# Patient Record
Sex: FEMALE | Race: WHITE | Employment: FULL TIME | URBAN - METROPOLITAN AREA
[De-identification: names, ages, dates, MRNs, and addresses within clinical notes are randomized per-mention and may not be internally consistent; named-entity substitution may affect disease eponyms.]

---

## 2019-07-11 ENCOUNTER — TRANSFERRED RECORDS (OUTPATIENT)
Dept: HEALTH INFORMATION MANAGEMENT | Facility: CLINIC | Age: 44
End: 2019-07-11

## 2020-06-04 ENCOUNTER — TRANSFERRED RECORDS (OUTPATIENT)
Dept: HEALTH INFORMATION MANAGEMENT | Facility: CLINIC | Age: 45
End: 2020-06-04

## 2020-08-21 ENCOUNTER — VIRTUAL VISIT (OUTPATIENT)
Dept: ENDOCRINOLOGY | Facility: CLINIC | Age: 45
End: 2020-08-21
Payer: COMMERCIAL

## 2020-08-21 DIAGNOSIS — E89.0 POSTABLATIVE HYPOTHYROIDISM: Primary | ICD-10-CM

## 2020-08-21 DIAGNOSIS — R07.0 THROAT PAIN: ICD-10-CM

## 2020-08-21 PROCEDURE — 99202 OFFICE O/P NEW SF 15 MIN: CPT | Mod: TEL | Performed by: INTERNAL MEDICINE

## 2020-08-21 RX ORDER — OMEGA-3 FATTY ACIDS/FISH OIL 300-1000MG
CAPSULE ORAL
COMMUNITY

## 2020-08-21 RX ORDER — BIOTIN 1 MG
1000 TABLET ORAL DAILY
COMMUNITY

## 2020-08-21 RX ORDER — LEVOTHYROXINE SODIUM 88 UG/1
88 TABLET ORAL DAILY
COMMUNITY

## 2020-08-21 RX ORDER — LACTOBACILLUS RHAMNOSUS GG 10B CELL
1 CAPSULE ORAL 2 TIMES DAILY
COMMUNITY

## 2020-08-21 RX ORDER — ANTIARTHRITIC COMBINATION NO.2 900 MG
TABLET ORAL DAILY
COMMUNITY

## 2020-08-21 RX ORDER — LIOTHYRONINE SODIUM 5 UG/1
TABLET ORAL
COMMUNITY

## 2020-08-21 NOTE — LETTER
"    8/21/2020         RE: Irena Cain  [address] [facility] [id number]        Dear Colleague,    Thank you for referring your patient, Irena Cain, to the [facility]. Please see a copy of my visit note below.    Irena Cain is a 45 year old female who is being evaluated via a billable telephone visit.      The patient has been notified of following:     \"This telephone visit will be conducted via a call between you and your physician/provider. We have found that certain health care needs can be provided without the need for a physical exam.  This service lets us provide the care you need with a short phone conversation.  If a prescription is necessary we can send it directly to your pharmacy.  If lab work is needed we can place an order for that and you can then stop by our lab to have the test done at a later time.    Telephone visits are billed at different rates depending on your insurance coverage. During this emergency period, for some insurers they may be billed the same as an in-person visit.  Please reach out to your insurance provider with any questions.    If during the course of the call the physician/provider feels a telephone visit is not appropriate, you will not be charged for this service.\"    Patient has given verbal consent for Telephone visit?  Yes    What phone number would you like to be contacted at? [phone]    How would you like to obtain your AVS? Mail a copy    Phone call duration: 10 minutes    Verna Plata CMA    CC: Hyperthyroidism    HPI: Patient here for management of hyperthyroidism.     Outside notes reviewed, Dr Fofana in 2012:   \"She has been under my care since 2009 when she switched care from Dr. Agustin Redmond to myself. She has a history of Graves' disease  with positive thyroid-stimulating immunoglobulin and has been on  methimazole or PTU since 2005. She wanted to have a second child and  therefore, decided to put " "off permanent treatment. However, she now had  to moved back and we are going to transfer care in May 2011 to an  endocrinologist in [place]. Unfortunately, she had a wait of six  months and could not get in to see them, and now would like to continue  to see me. She is now back here for follow up and had labs recently on 5  mg of methimazole. She takes half of the 10 mg tablet and has been on  this regimen for several years. Her recent labs show a TSH of 1.25, free  T4 of 1.0, a T3 of 81, hemoglobin 13 and hematocrit 39, and white cell  count 6, and also normal liver enzymes including ALT and AST of 26 and 20.\"    She received 15.8 mCi of I 131 on 11/26/2012.   She is now on levothyroxine 88 mcg daily and cytomel 5 mcg BID.   Notes when she was on 100 mcg daily she felt more anxious and irritable.   Comments she is on the cytomel BID because she feels it helps with \"neck swelling\".   This has not been examined by ENT yet.   She will also get dull headaches around this time of day of well but it does not correlate with the neck swelling all the time.   She notes that often this is later in the afternoon when she has not had much fluid to drink since lunch. She then drinks 20 ounces of water with her cytomel and HA settles down.     Reports \"mild MODESTO\" and no CPAP recommended.   No PND, allergies, or GERD.     No recent changes in weight.   She is training for 1/2 marathon but does not follow any particular diet.   She was told to go on a gluten free diet in the past as she was told she had \"Hashimoto's\".   No clear change with gluten free diet.     She is taking bio-identicals (estrogen and progesterone).   Continues to have monthly menses.   She has been on these for ~ 7 years.   Notes her sister recently found to have breast cancer.   Patient is up to date with mammograms.     Labs from 6/2020:  Free T4 1.3  TSH 1.04  Free T3 3.4    ROS: 10 point ROS neg other than the symptoms noted above in the HPI.    PMH: " "  Graves' s/p I 131    Meds:  Current Outpatient Medications   Medication     B Complex Vitamins (VITAMIN B-COMPLEX PO)     biotin 1000 MCG TABS tablet     dhea 25 MG TABS     lactobacillus rhamnosus, GG, (CULTURELL) capsule     levothyroxine (SYNTHROID/LEVOTHROID) 88 MCG tablet     liothyronine (CYTOMEL) 5 MCG tablet     Multiple Vitamins-Minerals (ZINC PO)     omega 3 1000 MG CAPS     Progesterone Micronized (PROGESTERONE PO)     Vitamin D3 (CHOLECALCIFEROL) 125 MCG (5000 UT) tablet     TRI-NORINYL (28) OR TABS     No current facility-administered medications for this visit.      FHX:   Father has type 1 DM and hypothyroidism.     SHX:  Non-smoker.     Exam:   Gen: In NAD.     A/P:   Hypothyroidism - s/p I 131. She received 15.8 mCi of I 131 on 11/26/2012. . Extensive discussion of thyroid hormone and normal physiology. Included was discussion of thyroid in relation to weight and energy. I am not convinced her \"neck swelling\" has anything to do with her medication regime. I explained that whether she has TPO antibodies or not, a gluten free diet does not impact the course of Hashimoto's and furthermore her thyroid has been irradiated and there is nothing left to protect from autoimmune attack.  -No change to medication.   -Schedule an ultrasound.   -Labs 3 months.     Due to the COVID 19 pandemic this visit was a telephone/video visit in order to help prevent spread of infection in this high risk patient and the general population. The patient gave verbal consent for the visit today.    Start time 0830  Stop time 0855  Total time 25  This visit would have been billed as [id number] as an E & M code       Kosta Mclain MD on 8/21/2020 at 8:55 AM              Again, thank you for allowing me to participate in the care of your patient.        Sincerely,        Kosta Mclain MD    "

## 2020-08-21 NOTE — PROGRESS NOTES
"Irena Cain is a 45 year old female who is being evaluated via a billable telephone visit.      The patient has been notified of following:     \"This telephone visit will be conducted via a call between you and your physician/provider. We have found that certain health care needs can be provided without the need for a physical exam.  This service lets us provide the care you need with a short phone conversation.  If a prescription is necessary we can send it directly to your pharmacy.  If lab work is needed we can place an order for that and you can then stop by our lab to have the test done at a later time.    Telephone visits are billed at different rates depending on your insurance coverage. During this emergency period, for some insurers they may be billed the same as an in-person visit.  Please reach out to your insurance provider with any questions.    If during the course of the call the physician/provider feels a telephone visit is not appropriate, you will not be charged for this service.\"    Patient has given verbal consent for Telephone visit?  Yes    What phone number would you like to be contacted at? [phone]    How would you like to obtain your AVS? Mail a copy    Phone call duration: 10 minutes    Verna Plata CMA    CC: Hyperthyroidism    HPI: Patient here for management of hyperthyroidism.     Outside notes reviewed, Dr Fofana in 2012:   \"She has been under my care since 2009 when she switched care from Dr. Agustin Redmond to myself. She has a history of Graves' disease  with positive thyroid-stimulating immunoglobulin and has been on  methimazole or PTU since 2005. She wanted to have a second child and  therefore, decided to put off permanent treatment. However, she now had  to moved back and we are going to transfer care in May 2011 to an  endocrinologist in [place]. Unfortunately, she had a wait of six  months and could not get in to see them, and now would like to " "continue  to see me. She is now back here for follow up and had labs recently on 5  mg of methimazole. She takes half of the 10 mg tablet and has been on  this regimen for several years. Her recent labs show a TSH of 1.25, free  T4 of 1.0, a T3 of 81, hemoglobin 13 and hematocrit 39, and white cell  count 6, and also normal liver enzymes including ALT and AST of 26 and 20.\"    She received 15.8 mCi of I 131 on 11/26/2012.   She is now on levothyroxine 88 mcg daily and cytomel 5 mcg BID.   Notes when she was on 100 mcg daily she felt more anxious and irritable.   Comments she is on the cytomel BID because she feels it helps with \"neck swelling\".   This has not been examined by ENT yet.   She will also get dull headaches around this time of day of well but it does not correlate with the neck swelling all the time.   She notes that often this is later in the afternoon when she has not had much fluid to drink since lunch. She then drinks 20 ounces of water with her cytomel and HA settles down.     Reports \"mild MODESTO\" and no CPAP recommended.   No PND, allergies, or GERD.     No recent changes in weight.   She is training for 1/2 marathon but does not follow any particular diet.   She was told to go on a gluten free diet in the past as she was told she had \"Hashimoto's\".   No clear change with gluten free diet.     She is taking bio-identicals (estrogen and progesterone).   Continues to have monthly menses.   She has been on these for ~ 7 years.   Notes her sister recently found to have breast cancer.   Patient is up to date with mammograms.     Labs from 6/2020:  Free T4 1.3  TSH 1.04  Free T3 3.4    ROS: 10 point ROS neg other than the symptoms noted above in the HPI.    PMH:   Graves' s/p I 131    Meds:  Current Outpatient Medications   Medication     B Complex Vitamins (VITAMIN B-COMPLEX PO)     biotin 1000 MCG TABS tablet     dhea 25 MG TABS     lactobacillus rhamnosus, GG, (CULTURELL) capsule     levothyroxine " "(SYNTHROID/LEVOTHROID) 88 MCG tablet     liothyronine (CYTOMEL) 5 MCG tablet     Multiple Vitamins-Minerals (ZINC PO)     omega 3 1000 MG CAPS     Progesterone Micronized (PROGESTERONE PO)     Vitamin D3 (CHOLECALCIFEROL) 125 MCG (5000 UT) tablet     TRI-NORINYL (28) OR TABS     No current facility-administered medications for this visit.      FHX:   Father has type 1 DM and hypothyroidism.     SHX:  Non-smoker.     Exam:   Gen: In NAD.     A/P:   Hypothyroidism - s/p I 131. She received 15.8 mCi of I 131 on 11/26/2012. . Extensive discussion of thyroid hormone and normal physiology. Included was discussion of thyroid in relation to weight and energy. I am not convinced her \"neck swelling\" has anything to do with her medication regime. I explained that whether she has TPO antibodies or not, a gluten free diet does not impact the course of Hashimoto's and furthermore her thyroid has been irradiated and there is nothing left to protect from autoimmune attack.  -No change to medication.   -Schedule an ultrasound.   -Labs 3 months.     Due to the COVID 19 pandemic this visit was a telephone/video visit in order to help prevent spread of infection in this high risk patient and the general population. The patient gave verbal consent for the visit today.    Start time 0830  Stop time 0855  Total time 25  This visit would have been billed as [id number] as an E & M code       Kosta Mclain MD on 8/21/2020 at 8:55 AM            "

## 2020-08-21 NOTE — NURSING NOTE
"Chief Complaint   Patient presents with     New Patient     Thyroid Problem       Initial There were no vitals taken for this visit. Estimated body mass index is 24.15 kg/m  as calculated from the following:    Height as of 10/14/03: 1.645 m (5' 4.75\").    Weight as of 10/14/03: 65.3 kg (144 lb).  BP completed using cuff size: NA (Not Taken)  Medications and allergies reviewed.      Verna SAVAGE MA    "

## 2020-08-25 ENCOUNTER — HOSPITAL ENCOUNTER (OUTPATIENT)
Dept: ULTRASOUND IMAGING | Facility: CLINIC | Age: 45
Discharge: HOME | End: 2020-08-25
Attending: INTERNAL MEDICINE | Admitting: INTERNAL MEDICINE
Payer: COMMERCIAL

## 2020-08-25 DIAGNOSIS — R07.0 THROAT PAIN: ICD-10-CM

## 2020-08-25 PROCEDURE — 76536 US EXAM OF HEAD AND NECK: CPT

## 2020-08-26 ENCOUNTER — TELEPHONE (OUTPATIENT)
Dept: ENDOCRINOLOGY | Facility: CLINIC | Age: 45
End: 2020-08-26

## 2020-08-26 DIAGNOSIS — R07.0 THROAT PAIN: Primary | ICD-10-CM

## 2020-08-26 NOTE — TELEPHONE ENCOUNTER
----- Message from Kosta Mclain MD sent at 8/26/2020  7:41 AM CDT -----  There is a single small thyroid nodule present.   Given its small size, I doubt it is the cause of her discomfort.   I recommend she see ENT to investigate her neck pain further.   Kosta Mclain MD on 8/26/2020 at 7:39 AM

## 2020-08-26 NOTE — TELEPHONE ENCOUNTER
Called and discussed results with patient.  Patient verbalized understanding and has no questions.    Rigoberto Tam RN....8/26/2020 3:30 PM

## 2020-08-26 NOTE — TELEPHONE ENCOUNTER
Left message for patient to call [phone] until 4:30 today and [phone] after today.    Rigoberto Tam RN....8/26/2020 3:11 PM

## 2020-09-25 ENCOUNTER — OFFICE VISIT (OUTPATIENT)
Dept: OTOLARYNGOLOGY | Facility: CLINIC | Age: 45
End: 2020-09-25
Attending: INTERNAL MEDICINE
Payer: COMMERCIAL

## 2020-09-25 VITALS — HEART RATE: 72 BPM | DIASTOLIC BLOOD PRESSURE: 88 MMHG | SYSTOLIC BLOOD PRESSURE: 127 MMHG

## 2020-09-25 DIAGNOSIS — Z92.3 HISTORY OF RADIOACTIVE IODINE THYROID ABLATION: Primary | ICD-10-CM

## 2020-09-25 PROCEDURE — 99202 OFFICE O/P NEW SF 15 MIN: CPT | Performed by: OTOLARYNGOLOGY

## 2020-09-25 ASSESSMENT — ENCOUNTER SYMPTOMS
BLURRED VISION: 0
DIZZINESS: 0
HEMOPTYSIS: 0
BRUISES/BLEEDS EASILY: 0
STRIDOR: 0
DOUBLE VISION: 0
SORE THROAT: 0
SINUS PAIN: 0
HEARTBURN: 0
VOMITING: 0
NAUSEA: 0
COUGH: 0
PHOTOPHOBIA: 0
SPUTUM PRODUCTION: 0
CONSTITUTIONAL NEGATIVE: 1
TINGLING: 0
HEADACHES: 0

## 2020-09-25 ASSESSMENT — PAIN SCALES - GENERAL: PAINLEVEL: NO PAIN (0)

## 2020-09-25 NOTE — LETTER
9/25/2020         RE: Irena Cain  [address] [facility] [address]        Dear Colleague,    Thank you for referring your patient, Irena Cain, to the [facility]. Please see a copy of my visit note below.    HPI  This is a 45 year old patient who has been having throat swelling for months. On and off with rarely difficulty swallowing that goes away with a sip of water. Denies any facial pressure, fever, night sweat, weight changes, allergies or heartburn. She has a hx of Grave's disease, radioactive iodine treatment and she is on levothyroxine at this point.    Review of Systems   Constitutional: Negative.    HENT: Negative for congestion, ear discharge, ear pain, hearing loss, nosebleeds, sinus pain, sore throat and tinnitus.    Eyes: Negative for blurred vision, double vision and photophobia.   Respiratory: Negative for cough, hemoptysis, sputum production and stridor.    Gastrointestinal: Negative for heartburn, nausea and vomiting.   Skin: Negative.    Neurological: Negative for dizziness, tingling and headaches.   Endo/Heme/Allergies: Negative for environmental allergies. Does not bruise/bleed easily.         Physical Exam  Vitals signs reviewed.   Constitutional:       Appearance: Normal appearance.   HENT:      Head: Normocephalic and atraumatic.      Jaw: There is normal jaw occlusion.      Right Ear: Tympanic membrane, ear canal and external ear normal. No middle ear effusion. There is no impacted cerumen.      Left Ear: Tympanic membrane, ear canal and external ear normal.  No middle ear effusion. There is no impacted cerumen.      Nose: No septal deviation, mucosal edema, congestion or rhinorrhea.      Right Turbinates: Not enlarged or swollen.      Left Turbinates: Not enlarged or swollen.      Mouth/Throat:      Mouth: Mucous membranes are moist.      Pharynx: Oropharynx is clear. Uvula midline.   Neurological:      Mental Status: She is alert.        A/P  This is a 45 year old patient who has been having throat swelling. Her US of neck did not show any lymphadenopathy and I cannot palpate any pathologic swelling. She has a hx of radioactive iodine treatment and is on Levothyroxine. Options were discussed. I will see her sleep study result, thyroid functions and her hearing test and f/u as needed. Her questions were answered. Greater than 20 minutes were spent on this visit of which 50% were spent on counseling and coordinating of care.      Again, thank you for allowing me to participate in the care of your patient.        Sincerely,        Porsche Austin MD

## 2020-09-25 NOTE — PROGRESS NOTES
HPI  This is a 45 year old patient who has been having throat swelling for months. On and off with rarely difficulty swallowing that goes away with a sip of water. Denies any facial pressure, fever, night sweat, weight changes, allergies or heartburn. She has a hx of Grave's disease, radioactive iodine treatment and she is on levothyroxine at this point.    Review of Systems   Constitutional: Negative.    HENT: Negative for congestion, ear discharge, ear pain, hearing loss, nosebleeds, sinus pain, sore throat and tinnitus.    Eyes: Negative for blurred vision, double vision and photophobia.   Respiratory: Negative for cough, hemoptysis, sputum production and stridor.    Gastrointestinal: Negative for heartburn, nausea and vomiting.   Skin: Negative.    Neurological: Negative for dizziness, tingling and headaches.   Endo/Heme/Allergies: Negative for environmental allergies. Does not bruise/bleed easily.         Physical Exam  Vitals signs reviewed.   Constitutional:       Appearance: Normal appearance.   HENT:      Head: Normocephalic and atraumatic.      Jaw: There is normal jaw occlusion.      Right Ear: Tympanic membrane, ear canal and external ear normal. No middle ear effusion. There is no impacted cerumen.      Left Ear: Tympanic membrane, ear canal and external ear normal.  No middle ear effusion. There is no impacted cerumen.      Nose: No septal deviation, mucosal edema, congestion or rhinorrhea.      Right Turbinates: Not enlarged or swollen.      Left Turbinates: Not enlarged or swollen.      Mouth/Throat:      Mouth: Mucous membranes are moist.      Pharynx: Oropharynx is clear. Uvula midline.   Neurological:      Mental Status: She is alert.       A/P  This is a 45 year old patient who has been having throat swelling. Her US of neck did not show any lymphadenopathy and I cannot palpate any pathologic swelling. She has a hx of radioactive iodine treatment and is on Levothyroxine. Options were discussed. I  will see her sleep study result, thyroid functions and her hearing test and f/u as needed. Her questions were answered. Greater than 20 minutes were spent on this visit of which 50% were spent on counseling and coordinating of care.     The patient is a 13y Female complaining of shoulder pain/injury.

## 2020-09-25 NOTE — NURSING NOTE
Irena Cain's goals for this visit include:   Chief Complaint   Patient presents with     Neck Problem     neck swelling, occasional swallowing difficulty, check ears,       She requests these members of her care team be copied on today's visit information:     PCP: ONCOLOGY    Referring Provider:  Kosta Mclain MD  [address] [facility]  [place] [id number]    There were no vitals taken for this visit.    Do you need any medication refills at today's visit? No    Elva Morrissey RN

## 2020-10-05 ENCOUNTER — OFFICE VISIT (OUTPATIENT)
Dept: AUDIOLOGY | Facility: CLINIC | Age: 45
End: 2020-10-05
Payer: COMMERCIAL

## 2020-10-05 ENCOUNTER — TELEPHONE (OUTPATIENT)
Dept: AUDIOLOGY | Facility: CLINIC | Age: 45
End: 2020-10-05

## 2020-10-05 DIAGNOSIS — H93.293 ABNORMAL AUDITORY PERCEPTION, BILATERAL: Primary | ICD-10-CM

## 2020-10-05 DIAGNOSIS — E89.0 POSTABLATIVE HYPOTHYROIDISM: ICD-10-CM

## 2020-10-05 LAB
T3FREE SERPL-MCNC: 2.9 PG/ML (ref 2.3–4.2)
T4 FREE SERPL-MCNC: 1.01 NG/DL (ref 0.76–1.46)
TSH SERPL DL<=0.005 MIU/L-ACNC: 0.52 MU/L (ref 0.4–4)

## 2020-10-05 PROCEDURE — 36415 COLL VENOUS BLD VENIPUNCTURE: CPT | Performed by: INTERNAL MEDICINE

## 2020-10-05 PROCEDURE — 92550 TYMPANOMETRY & REFLEX THRESH: CPT | Performed by: AUDIOLOGIST

## 2020-10-05 PROCEDURE — 84481 FREE ASSAY (FT-3): CPT | Performed by: INTERNAL MEDICINE

## 2020-10-05 PROCEDURE — 92557 COMPREHENSIVE HEARING TEST: CPT | Performed by: AUDIOLOGIST

## 2020-10-05 PROCEDURE — 84443 ASSAY THYROID STIM HORMONE: CPT | Performed by: INTERNAL MEDICINE

## 2020-10-05 PROCEDURE — 84439 ASSAY OF FREE THYROXINE: CPT | Performed by: INTERNAL MEDICINE

## 2020-10-05 NOTE — PROGRESS NOTES
AUDIOLOGY REPORT    SUMMARY: Audiology visit completed. See audiogram for results.    RECOMMENDATIONS: Follow-up with ENT.    Joo Yuan  Doctor of Audiology  [place] License # [id number]

## 2020-10-05 NOTE — LETTER
October 6, 2020      Irena Cain  [address] [facility] [address]        Dear ,    We are writing to inform you of your test results.    Thyroid labs are normal.   No change to medication.   Repeat labs in 3 months and follow up with me after.     Resulted Orders   TSH   Result Value Ref Range    TSH 0.52 0.40 - 4.00 mU/L   T3 Free   Result Value Ref Range    Free T3 2.9 2.3 - 4.2 pg/mL   T4 free   Result Value Ref Range    T4 Free 1.01 0.76 - 1.46 ng/dL       If you have any questions or concerns, please call the clinic at the number listed above.       Sincerely,    Kosta Mclain MD

## 2020-10-05 NOTE — TELEPHONE ENCOUNTER
10/5 Provided phone number [phone] to schedule an appointment with ENT.     Bernie Villdea   Procedure    Ortho/Sports Med/Pod/Ent/Eye/Surgical Specialties  [facility] [place]   [phone]

## 2020-10-06 DIAGNOSIS — E89.0 POSTABLATIVE HYPOTHYROIDISM: Primary | ICD-10-CM

## 2023-05-21 ENCOUNTER — HEALTH MAINTENANCE LETTER (OUTPATIENT)
Age: 48
End: 2023-05-21